# Patient Record
Sex: MALE | Race: WHITE | NOT HISPANIC OR LATINO | Employment: OTHER | URBAN - METROPOLITAN AREA
[De-identification: names, ages, dates, MRNs, and addresses within clinical notes are randomized per-mention and may not be internally consistent; named-entity substitution may affect disease eponyms.]

---

## 2020-07-03 ENCOUNTER — HOSPITAL ENCOUNTER (EMERGENCY)
Facility: MEDICAL CENTER | Age: 70
End: 2020-07-03
Attending: EMERGENCY MEDICINE
Payer: MEDICARE

## 2020-07-03 ENCOUNTER — APPOINTMENT (OUTPATIENT)
Dept: RADIOLOGY | Facility: MEDICAL CENTER | Age: 70
End: 2020-07-03
Attending: EMERGENCY MEDICINE
Payer: MEDICARE

## 2020-07-03 VITALS
RESPIRATION RATE: 16 BRPM | HEIGHT: 70 IN | OXYGEN SATURATION: 99 % | TEMPERATURE: 97.5 F | DIASTOLIC BLOOD PRESSURE: 78 MMHG | WEIGHT: 173.94 LBS | SYSTOLIC BLOOD PRESSURE: 151 MMHG | HEART RATE: 80 BPM | BODY MASS INDEX: 24.9 KG/M2

## 2020-07-03 DIAGNOSIS — C91.10 CHRONIC LYMPHOCYTIC LEUKEMIA (HCC): ICD-10-CM

## 2020-07-03 LAB
ALBUMIN SERPL BCP-MCNC: 4.5 G/DL (ref 3.2–4.9)
ALBUMIN/GLOB SERPL: 2.4 G/DL
ALP SERPL-CCNC: 78 U/L (ref 30–99)
ALT SERPL-CCNC: 21 U/L (ref 2–50)
ANION GAP SERPL CALC-SCNC: 10 MMOL/L (ref 7–16)
APPEARANCE UR: CLEAR
AST SERPL-CCNC: 33 U/L (ref 12–45)
BASOPHILS # BLD AUTO: 0 % (ref 0–1.8)
BASOPHILS # BLD: 0 K/UL (ref 0–0.12)
BILIRUB SERPL-MCNC: 0.6 MG/DL (ref 0.1–1.5)
BILIRUB UR QL STRIP.AUTO: NEGATIVE
BUN SERPL-MCNC: 21 MG/DL (ref 8–22)
CALCIUM SERPL-MCNC: 9 MG/DL (ref 8.5–10.5)
CHLORIDE SERPL-SCNC: 105 MMOL/L (ref 96–112)
CO2 SERPL-SCNC: 22 MMOL/L (ref 20–33)
COLOR UR: YELLOW
CREAT SERPL-MCNC: 1.39 MG/DL (ref 0.5–1.4)
EOSINOPHIL # BLD AUTO: 0 K/UL (ref 0–0.51)
EOSINOPHIL NFR BLD: 0 % (ref 0–6.9)
ERYTHROCYTE [DISTWIDTH] IN BLOOD BY AUTOMATED COUNT: 47.3 FL (ref 35.9–50)
GLOBULIN SER CALC-MCNC: 1.9 G/DL (ref 1.9–3.5)
GLUCOSE SERPL-MCNC: 136 MG/DL (ref 65–99)
GLUCOSE UR STRIP.AUTO-MCNC: NEGATIVE MG/DL
HCT VFR BLD AUTO: 45.1 % (ref 42–52)
HGB BLD-MCNC: 14.4 G/DL (ref 14–18)
KETONES UR STRIP.AUTO-MCNC: NEGATIVE MG/DL
LEUKOCYTE ESTERASE UR QL STRIP.AUTO: NEGATIVE
LYMPHOCYTES # BLD AUTO: 132.2 K/UL (ref 1–4.8)
LYMPHOCYTES NFR BLD: 99.1 % (ref 22–41)
MANUAL DIFF BLD: NORMAL
MCH RBC QN AUTO: 30.1 PG (ref 27–33)
MCHC RBC AUTO-ENTMCNC: 31.9 G/DL (ref 33.7–35.3)
MCV RBC AUTO: 94.4 FL (ref 81.4–97.8)
MICRO URNS: NORMAL
MONOCYTES # BLD AUTO: 1.2 K/UL (ref 0–0.85)
MONOCYTES NFR BLD AUTO: 0.9 % (ref 0–13.4)
MORPHOLOGY BLD-IMP: NORMAL
NEUTROPHILS # BLD AUTO: 0 K/UL (ref 1.82–7.42)
NEUTROPHILS NFR BLD: 0 % (ref 44–72)
NITRITE UR QL STRIP.AUTO: NEGATIVE
NRBC # BLD AUTO: 0 K/UL
NRBC BLD-RTO: 0 /100 WBC
PATH REV: NORMAL
PATH REV: NORMAL
PH UR STRIP.AUTO: 5 [PH] (ref 5–8)
PLATELET # BLD AUTO: 161 K/UL (ref 164–446)
PLATELET BLD QL SMEAR: NORMAL
PMV BLD AUTO: 8.8 FL (ref 9–12.9)
POTASSIUM SERPL-SCNC: 4.4 MMOL/L (ref 3.6–5.5)
PROT SERPL-MCNC: 6.4 G/DL (ref 6–8.2)
PROT UR QL STRIP: NEGATIVE MG/DL
RBC # BLD AUTO: 4.78 M/UL (ref 4.7–6.1)
RBC UR QL AUTO: NEGATIVE
SODIUM SERPL-SCNC: 137 MMOL/L (ref 135–145)
SP GR UR STRIP.AUTO: 1.02
UROBILINOGEN UR STRIP.AUTO-MCNC: 0.2 MG/DL
WBC # BLD AUTO: 133.4 K/UL (ref 4.8–10.8)

## 2020-07-03 PROCEDURE — 71046 X-RAY EXAM CHEST 2 VIEWS: CPT

## 2020-07-03 PROCEDURE — 81003 URINALYSIS AUTO W/O SCOPE: CPT

## 2020-07-03 PROCEDURE — 99283 EMERGENCY DEPT VISIT LOW MDM: CPT

## 2020-07-03 PROCEDURE — 80053 COMPREHEN METABOLIC PANEL: CPT

## 2020-07-03 PROCEDURE — 80500 HCHG CLINICAL PATH CONSULT-LIMITED: CPT

## 2020-07-03 PROCEDURE — 85027 COMPLETE CBC AUTOMATED: CPT

## 2020-07-03 PROCEDURE — 36415 COLL VENOUS BLD VENIPUNCTURE: CPT

## 2020-07-03 PROCEDURE — 85007 BL SMEAR W/DIFF WBC COUNT: CPT

## 2020-07-03 SDOH — HEALTH STABILITY: MENTAL HEALTH: HOW OFTEN DO YOU HAVE A DRINK CONTAINING ALCOHOL?: 2-4 TIMES A MONTH

## 2020-07-03 ASSESSMENT — FIBROSIS 4 INDEX: FIB4 SCORE: 2.37

## 2020-07-03 ASSESSMENT — LIFESTYLE VARIABLES: DO YOU DRINK ALCOHOL: NO

## 2020-07-03 NOTE — ED NOTES
Pt resting comfortably. VSS Stable appears in no acute distress. Denies any additional needs.Call light within reach. Awaiting results of repeat BMP.

## 2020-07-03 NOTE — ED TRIAGE NOTES
Donny Diaz  Chief Complaint   Patient presents with   • Abnormal Labs   • Other     Pt ambulatory to triage with above complaint.  VSS, no acute distress.   Pt sent in from [facility],  Was told had WBC count of [id number] and concerned for leukemia.  Pt states also has not felt well over last 2 months, including increased fatigue.  Pt denies fever/ cough or contact with anyone positive for Covid/ Corona.  Pt/staff masked and in appropriate PPE during encounter.   Pt returned to lobby, educated on triage process, and to inform staff of any changes or concerns.

## 2020-07-03 NOTE — DISCHARGE INSTRUCTIONS
You have been provided the number for Dr. Wen, her oncologist on call.  Please call the office to make a follow-up appointment.    As you have an appointment already with the specialist in [place] you may begin your treatments and care through him.  Return to the emergency room if anything changes or worsens.

## 2020-07-03 NOTE — ED NOTES
Explained reason for repeat BMP. Pt verbalized understanding. Blood obtained through 21G Straight stick to left forearm.

## 2020-07-03 NOTE — ED PROVIDER NOTES
"ED Provider  Scribed for Daniel Crum D.O. by Carl Weinstein. 7/3/2020  8:30 AM    Means of arrival: Walk in  History obtained from: Patient  History limited by: None    CHIEF COMPLAINT  Chief Complaint   Patient presents with   • Abnormal Labs   • Other       HPI  Donny Diaz is a 69 y.o. male who presents with general fatigue onset 2 months ago. The patient states that yesterday he had his blood drawn and was told that he had an elevated WBC of 149,000. His nurse practitioner stated she suspected some sort of lukemia and he was referred to hematology. The patient states that he made an appointment for next week, but would just like to get an official diagnosis. He endorses night sweats, but denies any chest pain, cough, fever or chills. No exacerbating or alleviating factors were stated.      REVIEW OF SYSTEMS  See HPI for further details. All other systems are negative.     PAST MEDICAL HISTORY   has a past medical history of DDD (degenerative disc disease), lumbar.    SOCIAL HISTORY  Social History     Tobacco Use   • Smoking status: Never Smoker   • Smokeless tobacco: Never Used   Substance and Sexual Activity   • Alcohol use: Yes     Frequency: 2-4 times a month   • Drug use: Yes     Types: Marijuana, Inhaled     Comment: rare       SURGICAL HISTORY   has a past surgical history that includes acl repair (Left).    CURRENT MEDICATIONS  Home Medications     Reviewed by Norah Yousif R.N. (Registered Nurse) on 07/03/20 at 0812  Med List Status: Partial   Medication Last Dose Status   Famotidine (PEPCID PO)  Active   Multiple Vitamins-Minerals (ICAPS AREDS 2 PO)  Active   Naproxen Sodium (ALEVE PO)  Active   Probiotic Product (SOLUBLE FIBER/PROBIOTICS PO)  Active   S-Adenosylmethionine (TIM-E PO)  Active                ALLERGIES  No Known Allergies    PHYSICAL EXAM  VITAL SIGNS: /77   Pulse 80   Temp 36.4 °C (97.5 °F) (Temporal)   Resp 18   Ht 1.778 m (5' 10\")   Wt 78.9 kg (173 lb 15.1 oz)   " SpO2 97%   BMI 24.96 kg/m²   Constitutional: Alert in no apparent distress.  HENT: No signs of trauma, mucous membranes are moist  Eyes: Conjunctiva normal, Non-icteric.   Neck: Normal range of motion, No tenderness, Supple.  Lymphatic: No lymphadenopathy noted.   Cardiovascular: Regular rate and rhythm, no murmurs.   Thorax & Lungs: Normal breath sounds, No respiratory distress, No wheezing, No chest tenderness.   Abdomen: Bowel sounds normal, Soft, No tenderness, No masses, No pulsatile masses. No peritoneal signs.  Skin: Warm, Dry, normal color.   Back: No bony tenderness, No CVA tenderness.   Extremities: No edema, No tenderness, No cyanosis  Musculoskeletal: Good range of motion in all major joints. No tenderness to palpation or major deformities noted.   Neurologic: Alert and oriented x4, Normal motor function, Normal sensory function, No focal deficits noted.   Psychiatric: Affect normal, Judgment normal, Mood normal.       DIAGNOSTIC STUDIES / PROCEDURES    LABS  Results for orders placed or performed during the hospital encounter of 07/03/20   CBC WITH DIFFERENTIAL   Result Value Ref Range    .4 (HH) 4.8 - 10.8 K/uL    RBC 4.78 4.70 - 6.10 M/uL    Hemoglobin 14.4 14.0 - 18.0 g/dL    Hematocrit 45.1 42.0 - 52.0 %    MCV 94.4 81.4 - 97.8 fL    MCH 30.1 27.0 - 33.0 pg    MCHC 31.9 (L) 33.7 - 35.3 g/dL    RDW 47.3 35.9 - 50.0 fL    Platelet Count 161 (L) 164 - 446 K/uL    MPV 8.8 (L) 9.0 - 12.9 fL    Neutrophils-Polys 0.00 (L) 44.00 - 72.00 %    Lymphocytes 99.10 (H) 22.00 - 41.00 %    Monocytes 0.90 0.00 - 13.40 %    Eosinophils 0.00 0.00 - 6.90 %    Basophils 0.00 0.00 - 1.80 %    Nucleated RBC 0.00 /100 WBC    Neutrophils (Absolute) 0.00 (LL) 1.82 - 7.42 K/uL    Lymphs (Absolute) 132.20 (H) 1.00 - 4.80 K/uL    Monos (Absolute) 1.20 (H) 0.00 - 0.85 K/uL    Eos (Absolute) 0.00 0.00 - 0.51 K/uL    Baso (Absolute) 0.00 0.00 - 0.12 K/uL    NRBC (Absolute) 0.00 K/uL   COMP METABOLIC PANEL   Result Value  Ref Range    Sodium 137 135 - 145 mmol/L    Potassium 4.4 3.6 - 5.5 mmol/L    Chloride 105 96 - 112 mmol/L    Co2 22 20 - 33 mmol/L    Anion Gap 10.0 7.0 - 16.0    Glucose 136 (H) 65 - 99 mg/dL    Bun 21 8 - 22 mg/dL    Creatinine 1.39 0.50 - 1.40 mg/dL    Calcium 9.0 8.5 - 10.5 mg/dL    AST(SGOT) 33 12 - 45 U/L    ALT(SGPT) 21 2 - 50 U/L    Alkaline Phosphatase 78 30 - 99 U/L    Total Bilirubin 0.6 0.1 - 1.5 mg/dL    Albumin 4.5 3.2 - 4.9 g/dL    Total Protein 6.4 6.0 - 8.2 g/dL    Globulin 1.9 1.9 - 3.5 g/dL    A-G Ratio 2.4 g/dL   URINALYSIS CULTURE, IF INDICATED    Specimen: Urine   Result Value Ref Range    Color Yellow     Character Clear     Specific Gravity 1.019 <1.035    Ph 5.0 5.0 - 8.0    Glucose Negative Negative mg/dL    Ketones Negative Negative mg/dL    Protein Negative Negative mg/dL    Bilirubin Negative Negative    Urobilinogen, Urine 0.2 Negative    Nitrite Negative Negative    Leukocyte Esterase Negative Negative    Occult Blood Negative Negative    Micro Urine Req see below    ESTIMATED GFR   Result Value Ref Range    GFR If African American >60 >60 mL/min/1.73 m 2    GFR If Non  51 (A) >60 mL/min/1.73 m 2   DIFFERENTIAL MANUAL   Result Value Ref Range    Manual Diff Status PERFORMED    PERIPHERAL SMEAR REVIEW   Result Value Ref Range    Peripheral Smear Review see below    PLATELET ESTIMATE   Result Value Ref Range    Plt Estimation Decreased       All labs reviewed by me.    RADIOLOGY  DX-CHEST-2 VIEWS   Final Result      1.  Unremarkable two view chest.        The radiologist's interpretations of all radiological studies have been reviewed by me.    Films have been independently by me      COURSE  Pertinent Labs & Imaging studies reviewed. (See chart for details)    8:30 AM - Patient seen and examined at bedside. Discussed plan of care. Ordered for UA, CMP, CBC with differential, platelet estimate, path inter heme, peripheral smear review, differential manual, estimated GFR,  and dx-chest to evaluate his symptoms.     10:26 AM Potassium resulted at 6.6 today. Upon record review his potassium level was 4.4 yesterday. Repeat lab will be done to evaluate for accuracy of result.     12:32 PM I discussed the patient's case and the above findings with Dr. Wen (Oncology) who states that based on patient CBC and differential counts that he is stable to continue with planned follow up.     12:55 PM Patient was reevaluated at bedside. Discussed labs and radiology results with the patient. I spoke with patient and he still has an appointment with a doctor in [place]. I also gave him the number of Dr. Wen incase he prefers to see him.     MEDICAL DECISION MAKING  This is a 69 y.o. male who presents with fatigue malaise, he had outpatient lab test done that showed a significantly elevated WBC count.  On repeat here his WBCs are still 133.  Spoke with Dr. Wen our oncologist, and with the numbers that he has since suspected C LL.  He does not require any immediate intervention.  Patient has an appoint with an oncologist in [place] but is hoping to get an appointment with Dr. Wen.  Was given the phone number for Dr. Wen's office and he is to call her on Monday to make a follow-up appointment.      The patient will return for new or worsening symptoms and is stable at the time of discharge.    The patient is referred to a primary physician for blood pressure management, diabetic screening, and for all other preventative health concerns.    DISPOSITION:  Patient will be discharged home in stable condition.    FOLLOW UP:  Philipp Wen M.D.  [address] [place] [id number] [phone]    In 3 days        OUTPATIENT MEDICATIONS:  New Prescriptions    No medications on file        FINAL IMPRESSION  1. Chronic lymphocytic leukemia (HCC)         Carl CLEVELAND (Scribe), am scribing for, and in the presence of, Daniel Crum D.O..    Electronically signed by: Carl Weinstein  (Scribe), 7/3/2020    IDaniel D.O. personally performed the services described in this documentation, as scribed by Carl Weinstein in my presence, and it is both accurate and complete.  C  The note accurately reflects work and decisions made by me.  Daniel Crum D.O.  7/3/2020  3:24 PM

## 2020-07-03 NOTE — ED NOTES
Pt resting comfortably. VSS Stable appears in no acute distress. Denies any additional needs.Call light within reach. Awaiting ERP re-evaluation/consult

## 2020-07-09 PROBLEM — N40.0 BPH (BENIGN PROSTATIC HYPERPLASIA): Status: RESOLVED | Noted: 2020-07-09 | Resolved: 2020-07-09

## 2020-07-09 PROBLEM — D72.820 LYMPHOCYTOSIS: Status: ACTIVE | Noted: 2020-07-09

## 2020-07-09 PROBLEM — N40.0 BPH (BENIGN PROSTATIC HYPERPLASIA): Status: ACTIVE | Noted: 2020-07-09

## 2020-07-09 PROBLEM — C91.10 CLL (CHRONIC LYMPHOCYTIC LEUKEMIA) (HCC): Status: ACTIVE | Noted: 2020-07-09

## 2021-04-09 PROBLEM — D80.1 HYPOGAMMAGLOBULINEMIA, ACQUIRED (HCC): Status: ACTIVE | Noted: 2020-07-29

## 2021-11-10 ENCOUNTER — TELEPHONE (OUTPATIENT)
Dept: HEALTH INFORMATION MANAGEMENT | Facility: OTHER | Age: 71
End: 2021-11-10

## 2021-11-10 NOTE — TELEPHONE ENCOUNTER
[facility]. Member will have his wife call us back to schedule.   Attempt #1